# Patient Record
Sex: FEMALE | Race: WHITE | URBAN - METROPOLITAN AREA
[De-identification: names, ages, dates, MRNs, and addresses within clinical notes are randomized per-mention and may not be internally consistent; named-entity substitution may affect disease eponyms.]

---

## 2022-02-07 ENCOUNTER — OFFICE VISIT (OUTPATIENT)
Dept: URBAN - METROPOLITAN AREA CLINIC 64 | Facility: CLINIC | Age: 77
End: 2022-02-07
Payer: MEDICARE

## 2022-02-07 DIAGNOSIS — H25.13 AGE-RELATED NUCLEAR CATARACT, BILATERAL: ICD-10-CM

## 2022-02-07 DIAGNOSIS — H53.121 TRANSIENT VISUAL LOSS, RIGHT EYE: Primary | ICD-10-CM

## 2022-02-07 PROCEDURE — 92004 COMPRE OPH EXAM NEW PT 1/>: CPT | Performed by: OPTOMETRIST

## 2022-02-07 RX ORDER — SUMATRIPTAN AND NAPROXEN SODIUM 85; 500 MG/1; MG/1
TABLET, FILM COATED ORAL
Qty: 0 | Refills: 0 | Status: ACTIVE
Start: 2022-02-07

## 2022-02-07 ASSESSMENT — INTRAOCULAR PRESSURE
OS: 25
OD: 25

## 2022-02-07 NOTE — IMPRESSION/PLAN
Impression: Age-related nuclear cataract, bilateral: H25.13. Plan: Discussed cataract diagnosis with the patient. Discussed and reviewed treatment options for cataracts. Risks and benefits of surgical procedure were explained and understood. Patient elects surgical treatment. Schedule for consult with Dr. Tabatha Blair or Dr. Josefa Castanon.

## 2022-02-07 NOTE — IMPRESSION/PLAN
Impression: Transient visual loss, right eye: H53.121. Plan: Vision loss in right eye lasting seconds. Patient unsure if left eye was involved. Patient has also experienced episodes of confusion last minutes.  Recommend cardiovascular evaluation with PCP or cardiologist.

## 2022-02-25 ENCOUNTER — OFFICE VISIT (OUTPATIENT)
Dept: URBAN - METROPOLITAN AREA CLINIC 64 | Facility: CLINIC | Age: 77
End: 2022-02-25
Payer: MEDICARE

## 2022-02-25 DIAGNOSIS — H35.3131 BILATERAL NONEXUDATIVE AGE-RELATED MACULAR DEGENERATION, EARLY DRY STAGE: ICD-10-CM

## 2022-02-25 DIAGNOSIS — H40.053 OCULAR HYPERTENSION, BILATERAL: ICD-10-CM

## 2022-02-25 DIAGNOSIS — H43.813 VITREOUS DEGENERATION, BILATERAL: ICD-10-CM

## 2022-02-25 PROCEDURE — 92136 OPHTHALMIC BIOMETRY: CPT | Performed by: STUDENT IN AN ORGANIZED HEALTH CARE EDUCATION/TRAINING PROGRAM

## 2022-02-25 PROCEDURE — 99204 OFFICE O/P NEW MOD 45 MIN: CPT | Performed by: STUDENT IN AN ORGANIZED HEALTH CARE EDUCATION/TRAINING PROGRAM

## 2022-02-25 PROCEDURE — 92020 GONIOSCOPY: CPT | Performed by: STUDENT IN AN ORGANIZED HEALTH CARE EDUCATION/TRAINING PROGRAM

## 2022-02-25 RX ORDER — LATANOPROST 50 UG/ML
0.005 % SOLUTION OPHTHALMIC
Qty: 5 | Refills: 1 | Status: INACTIVE
Start: 2022-02-25 | End: 2022-02-25

## 2022-02-25 ASSESSMENT — INTRAOCULAR PRESSURE
OD: 25
OS: 25
OD: 28
OS: 21

## 2022-02-25 ASSESSMENT — VISUAL ACUITY
OD: 20/30
OS: 20/30

## 2022-02-25 NOTE — IMPRESSION/PLAN
Impression: Bilateral nonexudative age-related macular degeneration, early dry stage: H35.3131. Plan: Macular degeneration dry type. Patient was instructed the use of the Amsler grid and will continue monitoring vision. Recommend taking AREDS II vitamins and antioxidants. Patient informed to call office immediately with any vision and/or Amsler grid changes.

## 2022-02-25 NOTE — IMPRESSION/PLAN
Impression: Age-related nuclear cataract, bilateral: H25.13. Plan: Chart has been reviewed prior to seeing the patient and additional testing is necessary including A-scan/Lens Biometry. Discussed cataracts, treatment options, and surgical risks/benefits with patient including bleeding, infection, capsular break, glaucoma, corneal clouding. Patient understands there are tradeoffs to each intraocular lens choice and glasses may still be necessary after surgery. Pt understands that multifocal intraocular lenses have side effects including but not limited to Halos/Glare/Difficulty in Dim Lighting and Intermediate vision. The patient is bothered by the symptoms of his/her cataract which is not correctable with a change in glasses and their ADL's are impaired. Patient elects surgical treatment. Post op care to be patient's choice. Recommend ORA. Recommend Dexycu + Moxifloxacin (PF) Intracameral Injection. Lens Recommendation: Monofocal
Technology: OK for ORA and LENSX Aim OD: -0.25. Aim OS: -0.25. First Eye: RIGHT Notes:

## 2022-02-25 NOTE — IMPRESSION/PLAN
Impression: Ocular hypertension, bilateral: H40.053. Plan: No C/D Asymmetry. Open on Gonio 360 OU today, no element of angle closure. Elevated x 2 from prior visit. Recommend starting Latanoprost OU Qhs and follow up 1 week. Possible afib, avoid betablocker.  Currently getting worked up for amaurosis with PCP. 
-Start Latanoprost OU Qhs, schedule CEIOL OU, Follow up 1 week with IOP check

## 2022-03-18 ENCOUNTER — ADULT PHYSICAL (OUTPATIENT)
Dept: URBAN - METROPOLITAN AREA CLINIC 64 | Facility: CLINIC | Age: 77
End: 2022-03-18
Payer: MEDICARE

## 2022-03-18 DIAGNOSIS — Z01.818 ENCOUNTER FOR OTHER PREPROCEDURAL EXAMINATION: Primary | ICD-10-CM

## 2022-03-18 PROCEDURE — 99213 OFFICE O/P EST LOW 20 MIN: CPT | Performed by: STUDENT IN AN ORGANIZED HEALTH CARE EDUCATION/TRAINING PROGRAM

## 2022-03-18 PROCEDURE — 99203 OFFICE O/P NEW LOW 30 MIN: CPT | Performed by: PHYSICIAN ASSISTANT

## 2022-03-18 RX ORDER — TIMOLOL MALEATE 5 MG/ML
0.5 % SOLUTION/ DROPS OPHTHALMIC
Qty: 5 | Refills: 2 | Status: ACTIVE
Start: 2022-03-18

## 2022-03-18 ASSESSMENT — INTRAOCULAR PRESSURE
OD: 22
OS: 26
OS: 26
OD: 22

## 2022-03-18 NOTE — IMPRESSION/PLAN
Impression: Ocular hypertension, bilateral: H40.053.
-ALLERGIC TO LATANOPROST (HIVES/ITCHING)
-EKG normal with PCP, no afib
-CKD Stage 3, avoid oral diuretics Plan: -Allergy to Latanoprost
-Start Timolol BID OU Advised pt return to clinic if she notices any allergic symptoms after starting timolol. No MIGS recommended at time of cataract surgery. Advised patient there are other alternatives available that can be addressed in the future. Pt will be here for post-ops and we will recheck IOP OU at that time.

## 2022-03-22 ENCOUNTER — SURGERY (OUTPATIENT)
Dept: URBAN - METROPOLITAN AREA SURGERY 42 | Facility: SURGERY | Age: 77
End: 2022-03-22
Payer: MEDICARE

## 2022-03-22 PROCEDURE — 66984 XCAPSL CTRC RMVL W/O ECP: CPT | Performed by: STUDENT IN AN ORGANIZED HEALTH CARE EDUCATION/TRAINING PROGRAM

## 2022-03-22 PROCEDURE — PR1CP PR1CP: CUSTOM | Performed by: STUDENT IN AN ORGANIZED HEALTH CARE EDUCATION/TRAINING PROGRAM

## 2022-03-23 ENCOUNTER — POST-OPERATIVE VISIT (OUTPATIENT)
Dept: URBAN - METROPOLITAN AREA CLINIC 64 | Facility: CLINIC | Age: 77
End: 2022-03-23
Payer: MEDICARE

## 2022-03-23 DIAGNOSIS — Z48.810 ENCOUNTER FOR SURGICAL AFTERCARE FOLLOWING SURGERY ON A SENSE ORGAN: Primary | ICD-10-CM

## 2022-03-23 PROCEDURE — 99024 POSTOP FOLLOW-UP VISIT: CPT | Performed by: OPTOMETRIST

## 2022-03-23 ASSESSMENT — INTRAOCULAR PRESSURE: OD: 18

## 2022-03-23 NOTE — IMPRESSION/PLAN
Impression: S/P Cataract Extraction by phacoemulsification with IOL placement/ORA OD - 1 Day. Encounter for surgical aftercare following surgery on a sense organ  Z48.810.  Excellent post op course Plan:

## 2022-03-30 ENCOUNTER — POST-OPERATIVE VISIT (OUTPATIENT)
Dept: URBAN - METROPOLITAN AREA CLINIC 64 | Facility: CLINIC | Age: 77
End: 2022-03-30
Payer: MEDICARE

## 2022-03-30 PROCEDURE — 99024 POSTOP FOLLOW-UP VISIT: CPT | Performed by: OPTOMETRIST

## 2022-03-30 ASSESSMENT — VISUAL ACUITY: OD: 20/20

## 2022-03-30 ASSESSMENT — INTRAOCULAR PRESSURE: OD: 15

## 2022-03-30 NOTE — IMPRESSION/PLAN
Impression: S/P Cataract Extraction by phacoemulsification with IOL placement/ORA OD - 8 Days. Encounter for surgical aftercare following surgery on a sense organ  Z48.810. Plan: Doing well. [id number] Unique Ma for second eye.

## 2022-04-06 ENCOUNTER — SURGERY (OUTPATIENT)
Dept: URBAN - METROPOLITAN AREA SURGERY 42 | Facility: SURGERY | Age: 77
End: 2022-04-06
Payer: MEDICARE

## 2022-04-06 DIAGNOSIS — H52.222 REGULAR ASTIGMATISM, LEFT EYE: ICD-10-CM

## 2022-04-06 DIAGNOSIS — H25.812 COMBINED FORMS OF AGE-RELATED CATARACT, LEFT EYE: Primary | ICD-10-CM

## 2022-04-06 PROCEDURE — 66984 XCAPSL CTRC RMVL W/O ECP: CPT | Performed by: STUDENT IN AN ORGANIZED HEALTH CARE EDUCATION/TRAINING PROGRAM

## 2022-04-06 PROCEDURE — PR1CP PR1CP: CUSTOM | Performed by: STUDENT IN AN ORGANIZED HEALTH CARE EDUCATION/TRAINING PROGRAM

## 2022-04-07 ENCOUNTER — POST-OPERATIVE VISIT (OUTPATIENT)
Dept: URBAN - METROPOLITAN AREA CLINIC 64 | Facility: CLINIC | Age: 77
End: 2022-04-07
Payer: MEDICARE

## 2022-04-07 PROCEDURE — 99024 POSTOP FOLLOW-UP VISIT: CPT | Performed by: OPTOMETRIST

## 2022-04-07 ASSESSMENT — INTRAOCULAR PRESSURE: OS: 19

## 2022-04-07 NOTE — IMPRESSION/PLAN
Impression: S/P Cataract Extraction by phacoemulsification with IOL placement; ORA; LRI (Limbal Relaxing Incision) OS - 1 Day. Presence of intraocular lens  Z96.1. Plan: Corneal abrasion OS. Placed BCL RTC on Monday afternoon for removal. Cancel Wednesdays appt.

## 2022-04-11 ENCOUNTER — POST-OPERATIVE VISIT (OUTPATIENT)
Dept: URBAN - METROPOLITAN AREA CLINIC 64 | Facility: CLINIC | Age: 77
End: 2022-04-11
Payer: MEDICARE

## 2022-04-11 DIAGNOSIS — Z96.1 PRESENCE OF INTRAOCULAR LENS: Primary | ICD-10-CM

## 2022-04-11 NOTE — IMPRESSION/PLAN
Impression: S/P Cataract Extraction by phacoemulsification with IOL placement; ORA; LRI (Limbal Relaxing Incision) OS - 5 Days. Presence of intraocular lens  Z96.1. Plan: BCL removed. RTC in 1 week.

## 2022-04-21 ENCOUNTER — POST-OPERATIVE VISIT (OUTPATIENT)
Dept: URBAN - METROPOLITAN AREA CLINIC 64 | Facility: CLINIC | Age: 77
End: 2022-04-21
Payer: MEDICARE

## 2022-04-21 DIAGNOSIS — Z96.1 PRESENCE OF INTRAOCULAR LENS: Primary | ICD-10-CM

## 2022-04-21 PROCEDURE — 99024 POSTOP FOLLOW-UP VISIT: CPT | Performed by: OPTOMETRIST

## 2022-04-21 ASSESSMENT — INTRAOCULAR PRESSURE
OD: 15
OS: 12

## 2022-04-21 NOTE — IMPRESSION/PLAN
Impression: S/P Cataract Extraction by phacoemulsification with IOL placement; ORA; LRI (Limbal Relaxing Incision) OS - 15 Days. Presence of intraocular lens  Z96.1. Plan: Pt doing well. Gave samples of eye scrubs, discussed hot compresses for blepharitis today.

## 2022-11-18 ENCOUNTER — OFFICE VISIT (OUTPATIENT)
Dept: URBAN - METROPOLITAN AREA CLINIC 64 | Facility: CLINIC | Age: 77
End: 2022-11-18
Payer: MEDICARE

## 2022-11-18 DIAGNOSIS — H26.493 OTHER SECONDARY CATARACT, BILATERAL: Primary | ICD-10-CM

## 2022-11-18 DIAGNOSIS — H40.053 OCULAR HYPERTENSION, BILATERAL: ICD-10-CM

## 2022-11-18 PROCEDURE — 99214 OFFICE O/P EST MOD 30 MIN: CPT | Performed by: OPTOMETRIST

## 2022-11-18 ASSESSMENT — KERATOMETRY
OS: 45.03
OD: 45.30

## 2022-11-18 ASSESSMENT — INTRAOCULAR PRESSURE
OS: 15
OD: 15

## 2022-11-18 NOTE — IMPRESSION/PLAN
Impression: Ocular hypertension, bilateral: H40.053.
-ALLERGIC TO LATANOPROST (HIVES/ITCHING)
-EKG normal with PCP, no afib
-CKD Stage 3, avoid oral diuretics Plan: IOP OD:15 OS:15
-Allergy to Latanoprost
-Continue Timolol BID OU

## 2022-11-18 NOTE — IMPRESSION/PLAN
Impression: Other secondary cataract, bilateral: H26.493. Plan: S/p CE/IOL OU  PC haze, Recommend YAG PC. Monitor with yearly eye exams.

## 2022-12-07 ENCOUNTER — SURGERY (OUTPATIENT)
Dept: URBAN - METROPOLITAN AREA SURGERY 42 | Facility: SURGERY | Age: 77
End: 2022-12-07
Payer: MEDICARE

## 2022-12-07 PROCEDURE — 66821 AFTER CATARACT LASER SURGERY: CPT | Performed by: STUDENT IN AN ORGANIZED HEALTH CARE EDUCATION/TRAINING PROGRAM

## 2022-12-14 ENCOUNTER — SURGERY (OUTPATIENT)
Dept: URBAN - METROPOLITAN AREA SURGERY 42 | Facility: SURGERY | Age: 77
End: 2022-12-14
Payer: MEDICARE

## 2022-12-14 DIAGNOSIS — H26.491 OTHER SECONDARY CATARACT, RIGHT EYE: Primary | ICD-10-CM

## 2022-12-14 PROCEDURE — 66821 AFTER CATARACT LASER SURGERY: CPT | Performed by: STUDENT IN AN ORGANIZED HEALTH CARE EDUCATION/TRAINING PROGRAM

## 2022-12-21 ENCOUNTER — POST-OPERATIVE VISIT (OUTPATIENT)
Dept: URBAN - METROPOLITAN AREA CLINIC 64 | Facility: CLINIC | Age: 77
End: 2022-12-21
Payer: MEDICARE

## 2022-12-21 DIAGNOSIS — Z48.810 ENCOUNTER FOR SURGICAL AFTERCARE FOLLOWING SURGERY ON A SENSE ORGAN: Primary | ICD-10-CM

## 2022-12-21 DIAGNOSIS — H52.4 PRESBYOPIA: ICD-10-CM

## 2022-12-21 PROCEDURE — 99024 POSTOP FOLLOW-UP VISIT: CPT | Performed by: OPTOMETRIST

## 2022-12-21 ASSESSMENT — INTRAOCULAR PRESSURE
OS: 13
OD: 17

## 2022-12-21 ASSESSMENT — VISUAL ACUITY
OS: 20/20
OD: 20/20

## 2022-12-21 NOTE — IMPRESSION/PLAN
Impression: S/P YAG Capsulotomy (Yttrium Aluminum [place]) OD - 7 Days. Encounter for surgical aftercare following surgery on a sense organ  Z48.810. Plan: Patient doing well. RTC for 1 year CE with Dr. Beverly Anderson.

## 2023-03-10 ENCOUNTER — OFFICE VISIT (OUTPATIENT)
Dept: URBAN - METROPOLITAN AREA CLINIC 64 | Facility: CLINIC | Age: 78
End: 2023-03-10
Payer: MEDICARE

## 2023-03-10 DIAGNOSIS — H40.053 OCULAR HYPERTENSION, BILATERAL: Primary | ICD-10-CM

## 2023-03-10 DIAGNOSIS — H02.054 TRICHIASIS W/O ENTROPION OF LEFT UPPER EYELID: ICD-10-CM

## 2023-03-10 PROCEDURE — 99213 OFFICE O/P EST LOW 20 MIN: CPT | Performed by: OPTOMETRIST

## 2023-03-10 RX ORDER — TIMOLOL MALEATE 5 MG/ML
0.5 % SOLUTION/ DROPS OPHTHALMIC
Qty: 5 | Refills: 2 | Status: ACTIVE
Start: 2023-03-10

## 2023-03-10 NOTE — IMPRESSION/PLAN
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 Plan: Epilation of the left upper eye lid. Stable, continue to monitor.

## 2023-03-10 NOTE — IMPRESSION/PLAN
Impression: Ocular hypertension, bilateral: H40.053.
-ALLERGIC TO LATANOPROST (HIVES/ITCHING)
-EKG normal with PCP, no afib
-CKD Stage 3, avoid oral diuretics Plan: IOP OD:15 OS:15
-Allergy to Latanoprost
-Pt states d/c Timolol BID OU, will restart RTC 3 months for IOP check

## 2023-11-07 ENCOUNTER — OFFICE VISIT (OUTPATIENT)
Dept: URBAN - METROPOLITAN AREA CLINIC 64 | Facility: LOCATION | Age: 78
End: 2023-11-07
Payer: MEDICARE

## 2023-11-07 DIAGNOSIS — H43.393 OTHER VITREOUS OPACITIES, BILATERAL: ICD-10-CM

## 2023-11-07 DIAGNOSIS — H16.223 KERATOCONJUNCTIVITIS SICCA, BILATERAL: ICD-10-CM

## 2023-11-07 DIAGNOSIS — H40.053 OCULAR HYPERTENSION, BILATERAL: Primary | ICD-10-CM

## 2023-11-07 PROCEDURE — 99214 OFFICE O/P EST MOD 30 MIN: CPT | Performed by: OPTOMETRIST

## 2024-03-18 ENCOUNTER — OFFICE VISIT (OUTPATIENT)
Dept: URBAN - METROPOLITAN AREA CLINIC 64 | Facility: LOCATION | Age: 79
End: 2024-03-18
Payer: MEDICARE

## 2024-03-18 DIAGNOSIS — H40.053 OCULAR HYPERTENSION, BILATERAL: Primary | ICD-10-CM

## 2024-03-18 PROCEDURE — 99213 OFFICE O/P EST LOW 20 MIN: CPT | Performed by: OPTOMETRIST

## 2024-03-18 PROCEDURE — 92083 EXTENDED VISUAL FIELD XM: CPT | Performed by: OPTOMETRIST

## 2024-03-18 PROCEDURE — 92133 CPTRZD OPH DX IMG PST SGM ON: CPT | Performed by: OPTOMETRIST

## 2024-03-18 ASSESSMENT — INTRAOCULAR PRESSURE
OS: 18
OD: 18

## 2024-11-18 ENCOUNTER — OFFICE VISIT (OUTPATIENT)
Dept: URBAN - METROPOLITAN AREA CLINIC 64 | Facility: LOCATION | Age: 79
End: 2024-11-18
Payer: MEDICARE

## 2024-11-18 DIAGNOSIS — H04.123 TEAR FILM INSUFFICIENCY OF BILATERAL LACRIMAL GLANDS: ICD-10-CM

## 2024-11-18 DIAGNOSIS — H02.052 TRICHIASIS W/O ENTROPION OF RIGHT LOWER EYELID: ICD-10-CM

## 2024-11-18 DIAGNOSIS — H40.053 OCULAR HYPERTENSION, BILATERAL: Primary | ICD-10-CM

## 2024-11-18 PROCEDURE — 92133 CPTRZD OPH DX IMG PST SGM ON: CPT | Performed by: OPTOMETRIST

## 2024-11-18 PROCEDURE — 67820 REVISE EYELASHES: CPT | Performed by: OPTOMETRIST

## 2024-11-18 PROCEDURE — 99213 OFFICE O/P EST LOW 20 MIN: CPT | Performed by: OPTOMETRIST

## 2024-11-18 ASSESSMENT — INTRAOCULAR PRESSURE
OD: 17
OS: 18

## 2025-02-25 ENCOUNTER — OFFICE VISIT (OUTPATIENT)
Dept: URBAN - METROPOLITAN AREA CLINIC 64 | Facility: LOCATION | Age: 80
End: 2025-02-25
Payer: MEDICARE

## 2025-02-25 DIAGNOSIS — H04.123 TEAR FILM INSUFFICIENCY OF BILATERAL LACRIMAL GLANDS: ICD-10-CM

## 2025-02-25 DIAGNOSIS — H40.053 OCULAR HYPERTENSION, BILATERAL: Primary | ICD-10-CM

## 2025-02-25 PROCEDURE — 92134 CPTRZ OPH DX IMG PST SGM RTA: CPT | Performed by: OPTOMETRIST

## 2025-02-25 PROCEDURE — 99213 OFFICE O/P EST LOW 20 MIN: CPT | Performed by: OPTOMETRIST

## 2025-02-25 PROCEDURE — 92083 EXTENDED VISUAL FIELD XM: CPT | Performed by: OPTOMETRIST

## 2025-02-25 RX ORDER — ERYTHROMYCIN 5 MG/G
OINTMENT OPHTHALMIC
Qty: 1 | Refills: 1 | Status: ACTIVE
Start: 2025-02-25

## 2025-02-25 ASSESSMENT — INTRAOCULAR PRESSURE
OD: 16
OS: 15